# Patient Record
Sex: FEMALE | URBAN - METROPOLITAN AREA
[De-identification: names, ages, dates, MRNs, and addresses within clinical notes are randomized per-mention and may not be internally consistent; named-entity substitution may affect disease eponyms.]

---

## 2017-10-10 NOTE — PATIENT DISCUSSION
Surgery  Counseling: I have discussed the option of glasses versus cataract surgery versus following. It was explained that when vision no longer meets the patient?s visual needs and a new prescription for glasses is not likely to improve the patient?s visual symptoms, the option of cataract surgery is a reasonable next step. It was explained that there is no guarantee that removing the cataract will improve their visual symptoms, however; it is believed that the cataract is contributing to the patient's visual impairment and surgery may significantly improve both the visual and functional status of the patient. The risks, benefits and alternatives of surgery were discussed with the patient. After this discussion, the patient desires to proceed with cataract surgery with implantation of an intraocular lens to improve vision to drive safely at night with less glare and watch TV.

## 2017-10-10 NOTE — PATIENT DISCUSSION
Cataract and Monovision Counseling: Monovision means that the patient uses one eye to see at near and the other eye to see at distance. It was emphasized to the patient that the goal of reducing spectacle dependence not spectacle freedom is more realistic. It was explained that they will need glasses for intermediate and binocular vision for distance or near activities. The patient was informed that the range of focus with mono-vision after cataract surgery might be smaller due to the smaller depth of field with an artificial lens compared to their natural lens even in the setting of a cataract. The patient has been a successful mono-vision patient or must demonstrate a successful mono-vision contact lens trial prior to cataract surgery.

## 2017-10-10 NOTE — PATIENT DISCUSSION
Refractive Error Counseling, OU -  I have discussed the options for refractive surgery to decrease dependency on glasses and/or contact lenses after cataract surgery. It was discussed that while the success rate of cataract surgery is high (approximately 99 percent), success being defined as removing a cloudy lens and replacing it with a clear artificial lens, the success rate of getting the prescription corrected to meet or approximate the patients goals (within 0.5 diopters) is lower (about 75-80%). These options include: correction of astigmatism with limbal relaxing incision(s), LenSx arcuate incision(s) and/or TORIC IOL, monovision, nanovision, multifocal IOL, and EDOF IOL . It was emphasized to the patient that the goal of reducing spectacle dependence not spectacle freedom is more realistic. The patient understands it is possible they may still need a weak spectacle prescription and/or further refractive surgery to achieve their visual goal after surgery. It was explained that in some cases further refractive surgery may not be possible and glasses and/or contacts may be needed to achieve their best vision. No visual outcome was guaranteed.

## 2017-10-10 NOTE — PATIENT DISCUSSION
S/P LASIK OU -STABLE FOLLOW. D/W PATIENT OPT OF ORA DUE TO DECREASED PREDICTABILITY OF MEASUREMENTS.

## 2017-10-10 NOTE — PATIENT DISCUSSION
In the event the patient declines a refractive package, traditional cataract surgery will be performed and a monofocal IOL will be implanted to target distance vision (or plano).

## 2017-10-10 NOTE — PATIENT DISCUSSION
Previous Refractive Surgery (LASIK, PRK, RK, etc.) Counseling: Refractive surgery alters the shape of the cornea and changes the prescription of the eye. It was discussed that by having had previous refractive surgery, the predictability of getting the prescription correct after cataract surgery is even less (about 65-70%). It was discussed that refractive cataract surgery is not as predictable as LASIK/PRK/RK/etc. and the need for an enhancement exists. The patient understands that he/she may possibly need glasses to obtain the best quality of vision after having the cataract removed. It was emphasized to the patient that the goal of reducing spectacle dependence not spectacle freedom is more realistic.

## 2017-10-10 NOTE — PATIENT DISCUSSION
REFRACTIVE ERROR, OU - DISCUSSED OPTION OF CORRECTING AT THE TIME OF CATARACT SURGERY. SHE IS INTERESTED IN RECREATING HER MONOVA.

## 2017-10-18 NOTE — PATIENT DISCUSSION
New Prescription: ketorolac (ketorolac): drops: 0.4% 1 drop four times a day as directed into right eye 10-

## 2017-10-18 NOTE — PATIENT DISCUSSION
New Prescription: prednisolone acetate (prednisolone acetate): drops,suspension: 1% 1 drop four times a day as directed into right eye 10-

## 2017-10-18 NOTE — PATIENT DISCUSSION
New Prescription: ofloxacin (ofloxacin): drops: 0.3% 1 drop four times a day as directed into right eye 10-

## 2017-11-02 NOTE — PATIENT DISCUSSION
Continue: prednisolone acetate (prednisolone acetate): drops,suspension: 1% 1 drop four times a day as directed into right eye 10-

## 2017-11-02 NOTE — PATIENT DISCUSSION
***This patient had traditional cataract surgery performed. A monofocal IOL was placed to achieve a target refraction of PLANO (which should provide them with satisfactory distance vision with the aid of glasses/contact lenses). ***

## 2018-02-07 NOTE — PATIENT DISCUSSION
NONPROLIFERATIVE DIABETIC RETINOPATHY: NO CSDME OU.  RETURN FOR FOLLOW-UP AS SCHEDULED FOR DILATED EYE EXAM.

## 2018-02-07 NOTE — PATIENT DISCUSSION
RETINA IS ATTACHED OU: PVD OU (NEW PVD OD); NO HOLES OR TEARS SEEN ON DILATED EXAM TODAY.  RETINAL DETACHMENT SIGNS AND SYMPTOMS REVIEWED

## 2018-03-07 NOTE — PATIENT DISCUSSION
RETINA IS ATTACHED OD: PVD OD; NO HOLES OR TEARS SEEN ON DILATED EXAM TODAY.  RETINAL DETACHMENT SIGNS AND SYMPTOMS REVIEWED

## 2018-03-07 NOTE — PATIENT DISCUSSION
Post-laser (PRAMOD) Counseling:  Treatment of retinal breaks with laser greatly reduces but does not completely eliminate the risk of retinal detachment. Keep any scheduled appointments for re-evaluation of the retina, and report any increase in flashing lights or floaters.

## 2018-03-16 NOTE — PATIENT DISCUSSION
POST-OP WEEK 1 EXAM: s/p LASER RETINOPEXY FOR RETINAL TEAR, OD;  Doing well today. Retina attached. IOP within acceptable limits. Continue eyedrops in the surgical eye as instructed. Begin Pred taper. Discontinue Cipro and Atropine. Retinal detachment and endophthalmitis precautions reviewed. Instructed to call immediately for worsening vision, eye pain, or eye discharge.

## 2018-04-20 NOTE — PATIENT DISCUSSION
POST-OP MONTH 1 EXAM: s/p LASER RETINOPEXY OD. Doing well today. Retina attached. IOP within acceptable limits. Retinal detachment and endophthalmitis precautions reviewed. Instructed to call immediately for worsening vision, eye pain, or eye discharge.

## 2018-05-21 ENCOUNTER — IMPORTED ENCOUNTER (OUTPATIENT)
Dept: URBAN - METROPOLITAN AREA CLINIC 9 | Facility: CLINIC | Age: 23
End: 2018-05-21

## 2018-08-08 NOTE — PATIENT DISCUSSION
RETINA IS ATTACHED OU: PVD OU; s/p LASER RETINOPEXY OD;  NO HOLES OR TEARS SEEN ON DILATED EXAM TODAY.  RETINAL DETACHMENT SIGNS AND SYMPTOMS REVIEWED

## 2021-03-24 NOTE — PATIENT DISCUSSION
RETINA IS ATTACHED OU: PVD OU; s/p LASER RETINOPEXY OD; NO NEW  HOLES OR TEARS SEEN ON DILATED EXAM TODAY.  RETINAL DETACHMENT SIGNS AND SYMPTOMS REVIEWED

## 2021-10-16 ASSESSMENT — KERATOMETRY
OS_AXISANGLE2_DEGREES: 95
OD_AXISANGLE2_DEGREES: 88
OS_AXISANGLE_DEGREES: 5
OD_K2POWER_DIOPTERS: 44.25
OS_K1POWER_DIOPTERS: 43.5
OS_K2POWER_DIOPTERS: 44.25
OD_K1POWER_DIOPTERS: 43.5
OD_AXISANGLE_DEGREES: 178

## 2021-10-16 ASSESSMENT — VISUAL ACUITY
OD_CC: 20/20 SN
OS_CC: 20/20 SN
OD_SC: CF 2FT SN
OS_SC: CF 2FT SN